# Patient Record
Sex: FEMALE | URBAN - METROPOLITAN AREA
[De-identification: names, ages, dates, MRNs, and addresses within clinical notes are randomized per-mention and may not be internally consistent; named-entity substitution may affect disease eponyms.]

---

## 2018-08-02 ENCOUNTER — APPOINTMENT (OUTPATIENT)
Age: 33
Setting detail: DERMATOLOGY
End: 2018-08-03

## 2018-08-02 VITALS — WEIGHT: 180 LBS

## 2018-08-02 DIAGNOSIS — L80 VITILIGO: ICD-10-CM

## 2018-08-02 PROBLEM — L85.3 XEROSIS CUTIS: Status: ACTIVE | Noted: 2018-08-02

## 2018-08-02 PROCEDURE — OTHER MIPS QUALITY: OTHER

## 2018-08-02 PROCEDURE — OTHER COUNSELING: OTHER

## 2018-08-02 PROCEDURE — OTHER TREATMENT REGIMEN: OTHER

## 2018-08-02 PROCEDURE — OTHER PRESCRIPTION: OTHER

## 2018-08-02 PROCEDURE — OTHER FOLLOW UP FOR NEXT VISIT: OTHER

## 2018-08-02 PROCEDURE — 99202 OFFICE O/P NEW SF 15 MIN: CPT

## 2018-08-02 RX ORDER — PIMECROLIMUS 10 MG/G
THIN COAT CREAM TOPICAL DAILY
Qty: 1 | Refills: 3 | Status: ERX | COMMUNITY
Start: 2018-08-02

## 2018-08-02 ASSESSMENT — LOCATION ZONE DERM
LOCATION ZONE: FACE
LOCATION ZONE: TRUNK
LOCATION ZONE: ARM
LOCATION ZONE: LEG

## 2018-08-02 ASSESSMENT — LOCATION SIMPLE DESCRIPTION DERM
LOCATION SIMPLE: LEFT CHEEK
LOCATION SIMPLE: INFERIOR FOREHEAD
LOCATION SIMPLE: LEFT EYEBROW
LOCATION SIMPLE: LEFT FOREARM
LOCATION SIMPLE: RIGHT CALF
LOCATION SIMPLE: CHEST
LOCATION SIMPLE: LEFT CALF
LOCATION SIMPLE: RIGHT CHEEK
LOCATION SIMPLE: RIGHT UPPER BACK
LOCATION SIMPLE: RIGHT FOREARM
LOCATION SIMPLE: ABDOMEN

## 2018-08-02 ASSESSMENT — LOCATION DETAILED DESCRIPTION DERM
LOCATION DETAILED: LEFT PROXIMAL DORSAL FOREARM
LOCATION DETAILED: PERIUMBILICAL SKIN
LOCATION DETAILED: INFERIOR MID FOREHEAD
LOCATION DETAILED: RIGHT PROXIMAL CALF
LOCATION DETAILED: RIGHT INFERIOR CENTRAL MALAR CHEEK
LOCATION DETAILED: LEFT DISTAL CALF
LOCATION DETAILED: LEFT MEDIAL EYEBROW
LOCATION DETAILED: RIGHT PROXIMAL DORSAL FOREARM
LOCATION DETAILED: LEFT INFERIOR CENTRAL MALAR CHEEK
LOCATION DETAILED: RIGHT INFERIOR MEDIAL UPPER BACK
LOCATION DETAILED: MIDDLE STERNUM

## 2018-08-02 ASSESSMENT — SEVERITY VITILIGO: VITILIGO SEVERITY: 6

## 2018-08-02 NOTE — HPI: DISCOLORATION (VITILIGO)
How Severe Is It?: severe
Is This A New Presentation, Or A Follow-Up?: Discoloration
Additional History: Patient with a history of vitiligo for several years. She has been taking over-the-counter herbal supplements for the last few years with little to no benefit. She did see a dermatologist in the past and was given Elidel for two weeks but there was no improvement from that short course of medication. She is here for further treatment recommendations

## 2018-08-02 NOTE — PROCEDURE: TREATMENT REGIMEN
Detail Level: Zone
Plan: Start Elidel daily as directed, two weeks on and then one week off on going. Follow-up in two months. I’m going to look into the possibility of an at home light-based therapy and contact her if we are able to get this covered by her insurance. Referring her to a physician for phototherapy would be logistically impossible with her job

## 2018-08-20 ENCOUNTER — RX ONLY (RX ONLY)
Age: 33
End: 2018-08-20

## 2018-08-20 RX ORDER — DESONIDE 0.5 MG/G
THIN COAT CREAM TOPICAL QPM
Qty: 1 | Refills: 0 | Status: ERX | COMMUNITY
Start: 2018-08-20